# Patient Record
(demographics unavailable — no encounter records)

---

## 2025-05-09 NOTE — HISTORY OF PRESENT ILLNESS
[FreeTextEntry1] : 66yo P0 postmenopausal here for annual exam.  No complaints. [No] : Patient does not have concerns regarding sex [Previously active] : previously active [FreeTextEntry2] :  passed 1 year ago

## 2025-05-09 NOTE — DISCUSSION/SUMMARY
[FreeTextEntry1] : - Pap/HPV obtained today - DEXA done last year - Mammo/[facility]ono requisition given - Colonoscopy screening up to date   PHQ9 Screenin min

## 2025-05-09 NOTE — PHYSICAL EXAM
[Appropriately responsive] : appropriately responsive [Alert] : alert [No Acute Distress] : no acute distress [Soft] : soft [Non-tender] : non-tender [Non-distended] : non-distended [No HSM] : No HSM [No Lesions] : no lesions [No Mass] : no mass [Oriented x3] : oriented x3 [Examination Of The Breasts] : a normal appearance [No Masses] : no breast masses were palpable [Labia Majora] : normal [Labia Minora] : normal [Normal] : normal [Uterine Adnexae] : normal [FreeTextEntry2] : DEVYN Stevenson [Atrophy] : atrophy